# Patient Record
Sex: FEMALE | Race: WHITE | NOT HISPANIC OR LATINO
[De-identification: names, ages, dates, MRNs, and addresses within clinical notes are randomized per-mention and may not be internally consistent; named-entity substitution may affect disease eponyms.]

---

## 2020-07-06 PROBLEM — Z00.00 ENCOUNTER FOR PREVENTIVE HEALTH EXAMINATION: Status: ACTIVE | Noted: 2020-07-06

## 2020-07-16 ENCOUNTER — ASOB RESULT (OUTPATIENT)
Age: 35
End: 2020-07-16

## 2020-07-16 ENCOUNTER — APPOINTMENT (OUTPATIENT)
Dept: ANTEPARTUM | Facility: CLINIC | Age: 35
End: 2020-07-16

## 2020-07-16 ENCOUNTER — APPOINTMENT (OUTPATIENT)
Dept: MATERNAL FETAL MEDICINE | Facility: CLINIC | Age: 35
End: 2020-07-16
Payer: COMMERCIAL

## 2020-07-16 ENCOUNTER — APPOINTMENT (OUTPATIENT)
Dept: ANTEPARTUM | Facility: CLINIC | Age: 35
End: 2020-07-16
Payer: COMMERCIAL

## 2020-07-16 VITALS
HEART RATE: 79 BPM | WEIGHT: 182.31 LBS | RESPIRATION RATE: 18 BRPM | DIASTOLIC BLOOD PRESSURE: 78 MMHG | HEIGHT: 68 IN | SYSTOLIC BLOOD PRESSURE: 110 MMHG | OXYGEN SATURATION: 99 % | BODY MASS INDEX: 27.63 KG/M2

## 2020-07-16 DIAGNOSIS — Z98.890 OTHER SPECIFIED POSTPROCEDURAL STATES: ICD-10-CM

## 2020-07-16 DIAGNOSIS — Z87.59 PERSONAL HISTORY OF OTHER COMPLICATIONS OF PREGNANCY, CHILDBIRTH AND THE PUERPERIUM: ICD-10-CM

## 2020-07-16 PROCEDURE — 76813 OB US NUCHAL MEAS 1 GEST: CPT | Mod: 59

## 2020-07-16 PROCEDURE — 36416 COLLJ CAPILLARY BLOOD SPEC: CPT

## 2020-07-16 PROCEDURE — 99203 OFFICE O/P NEW LOW 30 MIN: CPT | Mod: 25

## 2020-07-16 RX ORDER — PRENATAL VIT NO.126/IRON/FOLIC 28MG-0.8MG
28-0.8 TABLET ORAL
Refills: 0 | Status: ACTIVE | COMMUNITY

## 2020-07-16 NOTE — DISCUSSION/SUMMARY
[FreeTextEntry1] : The patient is a 34-year-old -1-2-2 being seen today at approximately 12 weeks for advanced maternal age, previous 17-week pregnancy loss, incompetent cervix with cerclage placement.  Her obstetrical history is significant for 2 first trimester miscarriages.  She also had a twin pregnancy loss at 17 weeks after premature rupture of membranes and a diagnosis of incompetent cervix which did require D&E.  She had a pregnancy in , a liveborn male  weighing 9 pounds 9 ounces delivered at 38 weeks by normal spontaneous vaginal delivery with prophylatic cerclage placement.  Subsequent pregnancy in  a liveborn female  weighing 7 pounds 11 ounces delivered at 37 weeks.  Patient had cerclage placed prophylactically with that pregnancy as well.  No other problems or complications noted with either of these pregnancies.\par \par An Ultra-Screen evaluation was performed today.  A single viable intrauterine gestation with size consistent with dates is noted.  No abnormalities noted and a normal nuchal translucency is noted.  Vital signs today reveal a blood pressure of 110/78 and maternal weight is 182.5 pounds consistent with a BMI of 27.72 kg.\par \par Management of the pregnancy was discussed.  A prophylactic cerclage will be placed at approximately 14 weeks.  We will await the results of the Ultra-Screen evaluation performed today.  In addition a telehealth consultation with genetics will be performed tomorrow and noninterventional prenatal screening blood work will be obtained.  Should any of these results be abnormal we will delay the placement of the prophylactic cerclage.  Post cerclage management was discussed.  All of the above was discussed with the patient and all of her questions were answered.\par \par The patient has a noncontributory family and medical history.  She has had cerclage placed twice and had a D&E.  She has no known allergies to medications and denies alcohol, tobacco or drug use.\par \par I spent a total of 30 minutes face-to-face of which greater than 50% was counseling and coordinating care.\par \par Recommendations;\par \par 1.  Ultra-Screen evaluation performed today.\par 2.  Telehealth genetic counseling scheduled.\par 3.  Cerclage to be placed at approximately 14 weeks.\par 4.  Follow-up maternal-fetal medicine consultation as clinically indicated.

## 2020-07-17 ENCOUNTER — APPOINTMENT (OUTPATIENT)
Dept: ANTEPARTUM | Facility: CLINIC | Age: 35
End: 2020-07-17

## 2020-07-17 ENCOUNTER — APPOINTMENT (OUTPATIENT)
Dept: MATERNAL FETAL MEDICINE | Facility: CLINIC | Age: 35
End: 2020-07-17
Payer: COMMERCIAL

## 2020-07-17 ENCOUNTER — ASOB RESULT (OUTPATIENT)
Age: 35
End: 2020-07-17

## 2020-07-17 PROCEDURE — 99441: CPT

## 2020-07-22 LAB
1ST TRIMESTER DATA: NORMAL
ADDENDUM DOC: NORMAL
AFP PNL SERPL: NORMAL
AFP SERPL-ACNC: NORMAL
CLINICAL BIOCHEMIST REVIEW: NORMAL
FREE BETA HCG 1ST TRIMESTER: NORMAL
Lab: NORMAL
NASAL BONE: PRESENT
NOTES NTD: NORMAL
NT: NORMAL
PAPP-A SERPL-ACNC: NORMAL
TRISOMY 18/3: NORMAL

## 2020-09-10 ENCOUNTER — APPOINTMENT (OUTPATIENT)
Dept: ANTEPARTUM | Facility: CLINIC | Age: 35
End: 2020-09-10
Payer: COMMERCIAL

## 2020-09-10 ENCOUNTER — APPOINTMENT (OUTPATIENT)
Dept: ANTEPARTUM | Facility: CLINIC | Age: 35
End: 2020-09-10

## 2020-09-10 ENCOUNTER — ASOB RESULT (OUTPATIENT)
Age: 35
End: 2020-09-10

## 2020-09-10 PROCEDURE — 76811 OB US DETAILED SNGL FETUS: CPT

## 2020-09-10 PROCEDURE — 76817 TRANSVAGINAL US OBSTETRIC: CPT | Mod: 59

## 2020-09-16 LAB
1ST TRIMESTER DATA: NORMAL
2ND TRIMESTER DATA: NORMAL
AFP PNL SERPL: NORMAL
AFP SERPL-ACNC: NORMAL
AFP SERPL-ACNC: NORMAL
B-HCG FREE SERPL-MCNC: NORMAL
CLINICAL BIOCHEMIST REVIEW: NORMAL
FREE BETA HCG 1ST TRIMESTER: NORMAL
INHIBIN A SERPL-MCNC: NORMAL
NASAL BONE: PRESENT
NOTES NTD: NORMAL
NT: NORMAL
PAPP-A SERPL-ACNC: NORMAL
U ESTRIOL SERPL-SCNC: NORMAL

## 2020-10-08 ENCOUNTER — APPOINTMENT (OUTPATIENT)
Dept: ANTEPARTUM | Facility: CLINIC | Age: 35
End: 2020-10-08
Payer: COMMERCIAL

## 2020-10-08 ENCOUNTER — ASOB RESULT (OUTPATIENT)
Age: 35
End: 2020-10-08

## 2020-10-08 PROCEDURE — 76816 OB US FOLLOW-UP PER FETUS: CPT

## 2020-10-08 PROCEDURE — 76817 TRANSVAGINAL US OBSTETRIC: CPT

## 2020-10-29 ENCOUNTER — APPOINTMENT (OUTPATIENT)
Dept: ANTEPARTUM | Facility: CLINIC | Age: 35
End: 2020-10-29
Payer: COMMERCIAL

## 2020-10-29 ENCOUNTER — ASOB RESULT (OUTPATIENT)
Age: 35
End: 2020-10-29

## 2020-10-29 PROCEDURE — 76817 TRANSVAGINAL US OBSTETRIC: CPT

## 2020-10-29 PROCEDURE — 99072 ADDL SUPL MATRL&STAF TM PHE: CPT

## 2020-11-19 ENCOUNTER — APPOINTMENT (OUTPATIENT)
Dept: ANTEPARTUM | Facility: CLINIC | Age: 35
End: 2020-11-19
Payer: COMMERCIAL

## 2020-11-19 ENCOUNTER — ASOB RESULT (OUTPATIENT)
Age: 35
End: 2020-11-19

## 2020-11-19 PROCEDURE — 76817 TRANSVAGINAL US OBSTETRIC: CPT

## 2020-11-19 PROCEDURE — 76816 OB US FOLLOW-UP PER FETUS: CPT

## 2020-12-18 ENCOUNTER — ASOB RESULT (OUTPATIENT)
Age: 35
End: 2020-12-18

## 2020-12-18 ENCOUNTER — APPOINTMENT (OUTPATIENT)
Dept: ANTEPARTUM | Facility: CLINIC | Age: 35
End: 2020-12-18
Payer: COMMERCIAL

## 2020-12-18 PROCEDURE — 99072 ADDL SUPL MATRL&STAF TM PHE: CPT

## 2020-12-18 PROCEDURE — 76816 OB US FOLLOW-UP PER FETUS: CPT

## 2020-12-18 PROCEDURE — 76819 FETAL BIOPHYS PROFIL W/O NST: CPT

## 2021-01-14 ENCOUNTER — APPOINTMENT (OUTPATIENT)
Dept: ANTEPARTUM | Facility: CLINIC | Age: 36
End: 2021-01-14

## 2022-02-03 ENCOUNTER — ASOB RESULT (OUTPATIENT)
Age: 37
End: 2022-02-03

## 2022-02-03 ENCOUNTER — APPOINTMENT (OUTPATIENT)
Dept: MATERNAL FETAL MEDICINE | Facility: CLINIC | Age: 37
End: 2022-02-03
Payer: COMMERCIAL

## 2022-02-03 PROCEDURE — 99202 OFFICE O/P NEW SF 15 MIN: CPT | Mod: 95

## 2022-02-03 PROCEDURE — 99212 OFFICE O/P EST SF 10 MIN: CPT | Mod: 95

## 2022-02-08 ENCOUNTER — ASOB RESULT (OUTPATIENT)
Age: 37
End: 2022-02-08

## 2022-02-08 ENCOUNTER — APPOINTMENT (OUTPATIENT)
Dept: ANTEPARTUM | Facility: CLINIC | Age: 37
End: 2022-02-08
Payer: COMMERCIAL

## 2022-02-08 ENCOUNTER — NON-APPOINTMENT (OUTPATIENT)
Age: 37
End: 2022-02-08

## 2022-02-08 PROCEDURE — 76801 OB US < 14 WKS SINGLE FETUS: CPT

## 2022-02-08 PROCEDURE — 36415 COLL VENOUS BLD VENIPUNCTURE: CPT

## 2022-02-15 ENCOUNTER — NON-APPOINTMENT (OUTPATIENT)
Age: 37
End: 2022-02-15

## 2022-02-22 ENCOUNTER — APPOINTMENT (OUTPATIENT)
Dept: ANTEPARTUM | Facility: CLINIC | Age: 37
End: 2022-02-22
Payer: COMMERCIAL

## 2022-02-22 ENCOUNTER — ASOB RESULT (OUTPATIENT)
Age: 37
End: 2022-02-22

## 2022-02-22 ENCOUNTER — APPOINTMENT (OUTPATIENT)
Dept: MATERNAL FETAL MEDICINE | Facility: CLINIC | Age: 37
End: 2022-02-22
Payer: COMMERCIAL

## 2022-02-22 VITALS
BODY MASS INDEX: 27.58 KG/M2 | HEIGHT: 68 IN | SYSTOLIC BLOOD PRESSURE: 106 MMHG | DIASTOLIC BLOOD PRESSURE: 64 MMHG | WEIGHT: 182 LBS

## 2022-02-22 DIAGNOSIS — Z3A.12 12 WEEKS GESTATION OF PREGNANCY: ICD-10-CM

## 2022-02-22 DIAGNOSIS — Z98.890 OTHER SPECIFIED POSTPROCEDURAL STATES: ICD-10-CM

## 2022-02-22 DIAGNOSIS — O09.291 SUPERVISION OF PREGNANCY WITH OTHER POOR REPRODUCTIVE OR OBSTETRIC HISTORY, FIRST TRIMESTER: ICD-10-CM

## 2022-02-22 DIAGNOSIS — O99.810 ABNORMAL GLUCOSE COMPLICATING PREGNANCY: ICD-10-CM

## 2022-02-22 DIAGNOSIS — O35.1XX0 MATERNAL CARE FOR (SUSPECTED) CHROMOSOMAL ABNORMALITY IN FETUS, NOT APPLICABLE OR UNSPECIFIED: ICD-10-CM

## 2022-02-22 DIAGNOSIS — Z83.41 FAMILY HISTORY OF MULTIPLE ENDOCRINE NEOPLASIA [MEN] SYNDROME: ICD-10-CM

## 2022-02-22 DIAGNOSIS — O09.521 SUPERVISION OF ELDERLY MULTIGRAVIDA, FIRST TRIMESTER: ICD-10-CM

## 2022-02-22 DIAGNOSIS — Z98.890 SUPERVISION OF PREGNANCY WITH OTHER POOR REPRODUCTIVE OR OBSTETRIC HISTORY, FIRST TRIMESTER: ICD-10-CM

## 2022-02-22 PROCEDURE — 36416 COLLJ CAPILLARY BLOOD SPEC: CPT

## 2022-02-22 PROCEDURE — 76813 OB US NUCHAL MEAS 1 GEST: CPT

## 2022-02-22 PROCEDURE — ZZZZZ: CPT

## 2022-02-22 PROCEDURE — 99215 OFFICE O/P EST HI 40 MIN: CPT | Mod: 25

## 2022-02-22 RX ORDER — BLOOD SUGAR DIAGNOSTIC
STRIP MISCELLANEOUS
Qty: 3 | Refills: 3 | Status: DISCONTINUED | COMMUNITY
Start: 2020-10-29 | End: 2022-02-22

## 2022-02-22 RX ORDER — LANCETS
EACH MISCELLANEOUS
Qty: 1 | Refills: 3 | Status: DISCONTINUED | COMMUNITY
Start: 2020-10-29 | End: 2022-02-22

## 2022-02-22 RX ORDER — BLOOD-GLUCOSE METER
W/DEVICE EACH MISCELLANEOUS
Qty: 1 | Refills: 0 | Status: DISCONTINUED | COMMUNITY
Start: 2020-10-29 | End: 2022-02-22

## 2022-02-22 RX ORDER — BACILLUS COAGULANS/INULIN 1B-250 MG
CAPSULE ORAL
Refills: 0 | Status: ACTIVE | COMMUNITY

## 2022-02-22 NOTE — DISCUSSION/SUMMARY
[FreeTextEntry1] : The patient is a 36-year-old -1-3-3 being seen today at approximate 12 and half weeks for advanced maternal age, pregnancy achieved through in vitro fertilization, fetal macrosomia and history of cervical incompetence.  \par \par Her obstetrical history is significant for pregnancy in  with a loss at 16-1/2 weeks of a twin gestation secondary to presumed cervical incompetence.  Retained placenta noted and a D&C was performed.  Subsequent pregnancy in  a liveborn male  weighing 9 pounds 9 ounces delivered at 38 weeks via normal spontaneous vaginal delivery.  Cerclage was performed prophylactically.  Subsequent pregnancy in  a liveborn female  weighing 7 pounds 11 ounces also delivered via normal spontaneous vaginal delivery at 37-1/2 weeks.  Cerclage was placed prophylactically with that pregnancy and the last pregnancy in  a liveborn female  weighing 8 pounds 13 ounces delivered at 36-1/2 weeks via normal spontaneous vaginal delivery with prophylactic cerclage.  In addition she has had 2 chemical pregnancies which did not require D&C.  This pregnancy was achieved through in vitro fertilization.\par \par A first trimester ultrasound was performed today and reveals a single viable intrauterine gestation with size consistent with dates.  No abnormalities noted and the nuchal translucency was found to be within normal limits.  Ultra-Screen blood was obtained.  Vital signs today reveal a blood pressure of 106/64, maternal weight is 182 pounds consistent with a BMI of 27.67 kg.\par \par Advanced maternal age;\par \par The patient has had genetic counseling and a report was sent under separate cover.  Noninterventional prenatal screening blood work was performed and was found to be within normal limits.  Ultra-Screen bloods were obtained today.  Sequential blood repeat 16-18 weeks is recommended.  At this time diagnostic testing including CVS and amniocentesis are not recommended.\par \par In vitro fertilization;\par \par This pregnancy was also achieved through in vitro fertilization.  There is a slightly higher risk of congenital heart defects associated with pregnancies achieved through this form of assisted reproductive technology.  A fetal echo between 20 and 22 weeks will be scheduled.\par \par Cervical incompetence;\par \par The patient has a known history of cervical incompetence.  She has had 3 successful full-term pregnancies all after prophylactic cerclage was placed.  Cerclage was discussed.  Risks and benefits of this procedure were also discussed.  Ultra-Screen results will be ready in 1 week and should be within normal limits.  Prophylactic collage placed in 14 weeks is recommended and will be scheduled through your office.  Cervical length evaluation 2 weeks post Cerclage placement is recommended.\par \par Fetal macrosomia;\par \par The patient has a history of fetal macrosomia.  Diabetic testing was discussed.  The patient had normal 1 hour glucose challenge test with her previous pregnancy.  She was testing fasting blood sugars at home for the last month of the pregnancy.  3-hour glucose tolerance test was discussed.  She would prefer to have 1 hour GCT performed which should be done between 24 and 28 weeks.  Should that be normal I do not think home glucose monitoring is indicated even in the face of fetal macrosomia.\par \par She has a noncontributory family and medical history.  She has a history of a cerclage x3 as well as a D&C secondary to retained placenta.  She has no known allergies to medications and denies alcohol, tobacco or drug use.\par \par I spent a total of 43 minutes reviewing the patient's prenatal record, prenatal blood work, previous pregnancy information, previous consultations and ultrasound reports counseling coordinating care.\par \par Recommendations;\par \par 1.  Ultra-Screen blood work pending.\par 2.  Cerclage placed at 14 weeks.\par 3.  Follow-up cervical length evaluation 2 weeks post cerclage placement.\par 4.  Sequential blood work between 16 and 18 weeks.\par 5.  Comprehensive ultrasound at 20 weeks is recommended.\par 6.  Fetal echo between 20 and 22 weeks.\par 7.  Cervical length evaluation at 23 weeks.\par 8.  Follow-up maternal-fetal medicine consultation as clinically indicated.

## 2022-02-25 ENCOUNTER — NON-APPOINTMENT (OUTPATIENT)
Age: 37
End: 2022-02-25

## 2022-03-22 ENCOUNTER — APPOINTMENT (OUTPATIENT)
Dept: ANTEPARTUM | Facility: CLINIC | Age: 37
End: 2022-03-22
Payer: COMMERCIAL

## 2022-03-22 ENCOUNTER — ASOB RESULT (OUTPATIENT)
Age: 37
End: 2022-03-22

## 2022-03-22 DIAGNOSIS — Q99.9 CHROMOSOMAL ABNORMALITY, UNSPECIFIED: ICD-10-CM

## 2022-03-22 LAB
1ST TRIMESTER DATA: NORMAL
ADDENDUM DOC: NORMAL
AFP PNL SERPL: NORMAL
AFP SERPL-ACNC: NORMAL
CLINICAL BIOCHEMIST REVIEW: NORMAL
FREE BETA HCG 1ST TRIMESTER: NORMAL
Lab: NORMAL
NOTES NTD: NORMAL
NT: NORMAL
PAPP-A SERPL-ACNC: NORMAL
TRISOMY 18/3: NORMAL

## 2022-03-22 PROCEDURE — 36415 COLL VENOUS BLD VENIPUNCTURE: CPT

## 2022-03-22 PROCEDURE — 76817 TRANSVAGINAL US OBSTETRIC: CPT

## 2022-03-25 LAB
1ST TRIMESTER DATA: NORMAL
2ND TRIMESTER DATA: NORMAL
AFP PNL SERPL: NORMAL
AFP SERPL-ACNC: NORMAL
AFP SERPL-ACNC: NORMAL
B-HCG FREE SERPL-MCNC: NORMAL
CLINICAL BIOCHEMIST REVIEW: NORMAL
FREE BETA HCG 1ST TRIMESTER: NORMAL
INHIBIN A SERPL-MCNC: NORMAL
NOTES NTD: NORMAL
NT: NORMAL
PAPP-A SERPL-ACNC: NORMAL
U ESTRIOL SERPL-SCNC: NORMAL

## 2022-04-19 ENCOUNTER — APPOINTMENT (OUTPATIENT)
Dept: ANTEPARTUM | Facility: CLINIC | Age: 37
End: 2022-04-19
Payer: COMMERCIAL

## 2022-04-19 ENCOUNTER — ASOB RESULT (OUTPATIENT)
Age: 37
End: 2022-04-19

## 2022-04-19 DIAGNOSIS — Z31.83 ENCOUNTER FOR ASSISTED REPRODUCTIVE FERTILITY PROCEDURE CYCLE: ICD-10-CM

## 2022-04-19 PROCEDURE — 76811 OB US DETAILED SNGL FETUS: CPT

## 2022-04-19 PROCEDURE — 76817 TRANSVAGINAL US OBSTETRIC: CPT | Mod: 59

## 2022-04-26 ENCOUNTER — APPOINTMENT (OUTPATIENT)
Dept: PEDIATRIC CARDIOLOGY | Facility: CLINIC | Age: 37
End: 2022-04-26
Payer: COMMERCIAL

## 2022-04-26 DIAGNOSIS — O35.9XX0 MATERNAL CARE FOR (SUSPECTED) FETAL ABNORMALITY AND DAMAGE, UNSPECIFIED, NOT APPLICABLE OR UNSPECIFIED: ICD-10-CM

## 2022-04-26 DIAGNOSIS — Z87.59 PERSONAL HISTORY OF OTHER COMPLICATIONS OF PREGNANCY, CHILDBIRTH AND THE PUERPERIUM: ICD-10-CM

## 2022-04-26 DIAGNOSIS — O09.521 SUPERVISION OF ELDERLY MULTIGRAVIDA, FIRST TRIMESTER: ICD-10-CM

## 2022-04-26 DIAGNOSIS — Z78.9 OTHER SPECIFIED HEALTH STATUS: ICD-10-CM

## 2022-04-26 DIAGNOSIS — O09.529 SUPERVISION OF ELDERLY MULTIGRAVIDA, UNSPECIFIED TRIMESTER: ICD-10-CM

## 2022-04-26 DIAGNOSIS — Z3A.21 21 WEEKS GESTATION OF PREGNANCY: ICD-10-CM

## 2022-04-26 PROCEDURE — 76827 ECHO EXAM OF FETAL HEART: CPT

## 2022-04-26 PROCEDURE — 99204 OFFICE O/P NEW MOD 45 MIN: CPT | Mod: 25

## 2022-04-26 PROCEDURE — 76825 ECHO EXAM OF FETAL HEART: CPT

## 2022-04-26 PROCEDURE — 93325 DOPPLER ECHO COLOR FLOW MAPG: CPT | Mod: 58

## 2022-04-26 PROCEDURE — 76821 MIDDLE CEREBRAL ARTERY ECHO: CPT

## 2022-04-26 PROCEDURE — 76820 UMBILICAL ARTERY ECHO: CPT

## 2022-05-10 ENCOUNTER — APPOINTMENT (OUTPATIENT)
Dept: ANTEPARTUM | Facility: CLINIC | Age: 37
End: 2022-05-10
Payer: COMMERCIAL

## 2022-05-10 ENCOUNTER — ASOB RESULT (OUTPATIENT)
Age: 37
End: 2022-05-10

## 2022-05-10 PROCEDURE — 76817 TRANSVAGINAL US OBSTETRIC: CPT

## 2022-06-14 ENCOUNTER — APPOINTMENT (OUTPATIENT)
Dept: ANTEPARTUM | Facility: CLINIC | Age: 37
End: 2022-06-14
Payer: COMMERCIAL

## 2022-06-14 ENCOUNTER — ASOB RESULT (OUTPATIENT)
Age: 37
End: 2022-06-14

## 2022-06-14 PROCEDURE — 76816 OB US FOLLOW-UP PER FETUS: CPT

## 2022-07-26 ENCOUNTER — APPOINTMENT (OUTPATIENT)
Dept: ANTEPARTUM | Facility: CLINIC | Age: 37
End: 2022-07-26

## 2022-07-26 ENCOUNTER — ASOB RESULT (OUTPATIENT)
Age: 37
End: 2022-07-26

## 2022-07-26 PROCEDURE — 76819 FETAL BIOPHYS PROFIL W/O NST: CPT

## 2022-07-26 PROCEDURE — 76816 OB US FOLLOW-UP PER FETUS: CPT

## 2022-08-23 ENCOUNTER — APPOINTMENT (OUTPATIENT)
Dept: ANTEPARTUM | Facility: CLINIC | Age: 37
End: 2022-08-23